# Patient Record
Sex: FEMALE | Race: WHITE | NOT HISPANIC OR LATINO | Employment: OTHER | URBAN - METROPOLITAN AREA
[De-identification: names, ages, dates, MRNs, and addresses within clinical notes are randomized per-mention and may not be internally consistent; named-entity substitution may affect disease eponyms.]

---

## 2021-05-28 ENCOUNTER — RECORDS - HEALTHEAST (OUTPATIENT)
Dept: ADMINISTRATIVE | Facility: CLINIC | Age: 76
End: 2021-05-28

## 2021-05-30 ENCOUNTER — RECORDS - HEALTHEAST (OUTPATIENT)
Dept: ADMINISTRATIVE | Facility: CLINIC | Age: 76
End: 2021-05-30

## 2024-08-25 ENCOUNTER — OFFICE VISIT (OUTPATIENT)
Dept: URGENT CARE | Facility: URGENT CARE | Age: 79
End: 2024-08-25
Payer: COMMERCIAL

## 2024-08-25 VITALS
DIASTOLIC BLOOD PRESSURE: 74 MMHG | SYSTOLIC BLOOD PRESSURE: 159 MMHG | OXYGEN SATURATION: 97 % | TEMPERATURE: 97.6 F | HEART RATE: 74 BPM | WEIGHT: 106 LBS | RESPIRATION RATE: 18 BRPM

## 2024-08-25 DIAGNOSIS — T63.481A LOCAL REACTION TO HYMENOPTERA STING: Primary | ICD-10-CM

## 2024-08-25 PROBLEM — I25.119 ATHEROSCLEROSIS OF NATIVE CORONARY ARTERY OF NATIVE HEART WITH ANGINA PECTORIS (H): Status: ACTIVE | Noted: 2021-05-04

## 2024-08-25 PROBLEM — M18.11 PRIMARY OSTEOARTHRITIS OF FIRST CARPOMETACARPAL JOINT OF RIGHT HAND: Status: ACTIVE | Noted: 2019-06-06

## 2024-08-25 PROBLEM — D04.9 BASAL CELL CARCINOMA IN SITU OF SKIN: Status: ACTIVE | Noted: 2017-09-26

## 2024-08-25 PROBLEM — M19.041 PRIMARY OSTEOARTHRITIS OF BOTH HANDS: Status: ACTIVE | Noted: 2019-06-06

## 2024-08-25 PROBLEM — D03.9 LENTIGO MALIGNA (H): Status: ACTIVE | Noted: 2018-04-09

## 2024-08-25 PROBLEM — M19.042 PRIMARY OSTEOARTHRITIS OF BOTH HANDS: Status: ACTIVE | Noted: 2019-06-06

## 2024-08-25 PROCEDURE — 99203 OFFICE O/P NEW LOW 30 MIN: CPT | Performed by: PHYSICIAN ASSISTANT

## 2024-08-25 RX ORDER — TRIAMCINOLONE ACETONIDE 1 MG/G
CREAM TOPICAL 2 TIMES DAILY
Qty: 30 G | Refills: 0 | Status: SHIPPED | OUTPATIENT
Start: 2024-08-25

## 2024-08-25 RX ORDER — CETIRIZINE HYDROCHLORIDE 10 MG/1
10 TABLET ORAL DAILY
Qty: 7 TABLET | Refills: 0 | Status: SHIPPED | OUTPATIENT
Start: 2024-08-25 | End: 2024-09-01

## 2024-08-25 ASSESSMENT — ENCOUNTER SYMPTOMS
FEVER: 0
DIAPHORESIS: 0
COLOR CHANGE: 1
VOMITING: 0
CHILLS: 0

## 2024-08-25 NOTE — PROGRESS NOTES
Assessment & Plan:        ICD-10-CM    1. Local reaction to hymenoptera sting  T63.481A triamcinolone (KENALOG) 0.1 % external cream     cetirizine (ZYRTEC) 10 MG tablet            Plan/Clinical Decision Making:    Patient with suspected bug bite 2 to 3 days ago with a local reaction.  Has swelling and itching of right left foot No warmth, induration or tenderness.  Discussed signs of bacterial infection and to monitor will treat symptoms with steroid cream and with Zyrtec.      Return if symptoms worsen or fail to improve, for in 3-5 days.     At the end of the encounter, I discussed results, diagnosis, medications. Discussed red flags for immediate return to clinic/ER, as well as indications for follow up if no improvement. Patient understood and agreed to plan. Patient was stable for discharge.        Saige Miller PA-C on 8/25/2024 at 10:03 AM          Subjective:     HPI:    Angela is a 78 year old female who presents to clinic today for the following health issues:  Chief Complaint   Patient presents with    Foot Problems     C/o possible bug bite to the bottom of the left foot x2-3 days ago, now having swelling/pain/redness     HPI    Patient was out in garden and had possible bug bite left foot. Developed redness, swelling. Has had a lot of itching. No pain. No fever.     Review of Systems   Constitutional:  Negative for chills, diaphoresis and fever.   Gastrointestinal:  Negative for vomiting.   Skin:  Positive for color change.         Patient Active Problem List   Diagnosis    Basal cell carcinoma in situ of skin    Atherosclerosis of native coronary artery of native heart with angina pectoris (H24)    DDD (degenerative disc disease), cervical    DM (diabetes mellitus) type II, controlled, with peripheral vascular disorder (H)    HTN (hypertension)    Lentigo maligna (H)    Obstructive sleep apnea    Postural kyphosis of thoracic region    Primary osteoarthritis of both hands    Primary osteoarthritis  of first carpometacarpal joint of right hand    Pure hypercholesterolemia    Spinal stenosis in cervical region        No past medical history on file.    Social History     Tobacco Use    Smoking status: Never    Smokeless tobacco: Never   Substance Use Topics    Alcohol use: Not on file             Objective:     Vitals:    08/25/24 0952   BP: (!) 159/74   BP Location: Right arm   Patient Position: Sitting   Cuff Size: Adult Regular   Pulse: 74   Resp: 18   Temp: 97.6  F (36.4  C)   TempSrc: Temporal   SpO2: 97%   Weight: 48.1 kg (106 lb)         Physical Exam   EXAM: Grossly within normal care is going to go there is more hydroxyzine  Pleasant, alert, appropriate appearance. NAD.  Head Exam: Normocephalic, atraumatic.  Left foot- good ROM, excoriated area over anterior foot with slight scabbing. Mild erythema and swelling. No tenderness, no induration.     Results:  No results found for any visits on 08/25/24.